# Patient Record
Sex: FEMALE
[De-identification: names, ages, dates, MRNs, and addresses within clinical notes are randomized per-mention and may not be internally consistent; named-entity substitution may affect disease eponyms.]

---

## 2024-08-27 ENCOUNTER — TELEPHONE (OUTPATIENT)
Dept: PRIMARY CARE CLINIC | Facility: CLINIC | Age: 39
End: 2024-08-27

## 2024-08-27 NOTE — TELEPHONE ENCOUNTER
----- Message from Levar CARLSON sent at 8/27/2024 11:48 AM EDT -----  Regarding: [facility] Appointment Request  [facility] Appointment Request    Patient needs appointment for [facility] Appointment Type: New Patient.    Patient Requested Dates(s): As soon as possible   Patient Requested Time: As soon as possible   Provider Name: Dr. Lilian Galvan MD    Patient is requesting to have a New Patient appointment and get established care with Dr. Lilian Galvan MD as soon as possible due to her health condition.      Reason for Appointment Request: New Patient - Available appointments did not meet patient need  --------------------------------------------------------------------------------------------------------------------------    Relationship to Patient: Spouse/Partner  Clay Price    Call Back Information: OK to leave message on voicemail  Preferred Call Back Number: Phone [phone]